# Patient Record
Sex: MALE | Race: WHITE | NOT HISPANIC OR LATINO | Employment: UNEMPLOYED | URBAN - METROPOLITAN AREA
[De-identification: names, ages, dates, MRNs, and addresses within clinical notes are randomized per-mention and may not be internally consistent; named-entity substitution may affect disease eponyms.]

---

## 2020-05-06 ENCOUNTER — HOSPITAL ENCOUNTER (EMERGENCY)
Facility: HOSPITAL | Age: 25
Discharge: HOME | End: 2020-05-06
Attending: EMERGENCY MEDICINE

## 2020-05-06 VITALS
SYSTOLIC BLOOD PRESSURE: 114 MMHG | OXYGEN SATURATION: 97 % | TEMPERATURE: 99 F | HEIGHT: 71 IN | HEART RATE: 90 BPM | RESPIRATION RATE: 14 BRPM | WEIGHT: 150 LBS | BODY MASS INDEX: 21 KG/M2 | DIASTOLIC BLOOD PRESSURE: 62 MMHG

## 2020-05-06 DIAGNOSIS — G89.29 CHRONIC PAIN OF RIGHT THUMB: Primary | ICD-10-CM

## 2020-05-06 DIAGNOSIS — M79.644 CHRONIC PAIN OF RIGHT THUMB: Primary | ICD-10-CM

## 2020-05-06 DIAGNOSIS — Z72.0 TOBACCO USE: ICD-10-CM

## 2020-05-06 PROCEDURE — 99284 PR EMERGENCY DEPT VISIT,LEVEL IV: ICD-10-PCS | Mod: 25,,, | Performed by: EMERGENCY MEDICINE

## 2020-05-06 PROCEDURE — 99284 EMERGENCY DEPT VISIT MOD MDM: CPT | Mod: 25,,, | Performed by: EMERGENCY MEDICINE

## 2020-05-06 PROCEDURE — 99406 PR TOBACCO USE CESSATION INTERMEDIATE 3-10 MINUTES: ICD-10-PCS | Mod: ,,, | Performed by: EMERGENCY MEDICINE

## 2020-05-06 PROCEDURE — 99406 BEHAV CHNG SMOKING 3-10 MIN: CPT | Mod: ,,, | Performed by: EMERGENCY MEDICINE

## 2020-05-06 PROCEDURE — 99282 EMERGENCY DEPT VISIT SF MDM: CPT

## 2020-05-06 NOTE — ED PROVIDER NOTES
Encounter Date: 5/6/2020       History     Chief Complaint   Patient presents with    Thumb pain     Pt c/o right thumb pain.  States he was bitten by his cat approx 1 month ago-has had intermittent problems since.     HPI   24-year-old male previously healthy presenting with right thumb pain.  Patient states he was bitten by a cat 1 month ago and has had intermittent pain and swelling to his right thumb.  He denies any fevers, chills, nausea, vomiting or diarrhea.  He is requesting a work note.  Denies any falls or trauma.  Denies any arm pain, arm swelling.  Current pain scale 2/10.  He has not tried anything for pain medication.  No aggravating or alleviating factors.    Review of patient's allergies indicates:  No Known Allergies  History reviewed. No pertinent past medical history.  History reviewed. No pertinent surgical history.  History reviewed. No pertinent family history.  Social History     Tobacco Use    Smoking status: Current Every Day Smoker     Packs/day: 1.50     Types: Cigarettes    Smokeless tobacco: Never Used   Substance Use Topics    Alcohol use: No    Drug use: No     Review of Systems   Constitutional: Negative for fatigue and fever.   HENT: Negative for congestion and sore throat.    Eyes: Negative for visual disturbance.   Respiratory: Negative for chest tightness and shortness of breath.    Cardiovascular: Negative for chest pain and palpitations.   Gastrointestinal: Negative for abdominal pain, nausea and vomiting.   Genitourinary: Negative for dysuria and flank pain.   Musculoskeletal: Positive for myalgias. Negative for arthralgias, back pain and neck stiffness.   Skin: Negative for color change and wound.   Neurological: Negative for tremors and weakness.   Psychiatric/Behavioral: Negative for agitation and confusion. The patient is nervous/anxious.        Physical Exam     Initial Vitals [05/06/20 1401]   BP Pulse Resp Temp SpO2   114/62 90 14 98.6 °F (37 °C) 97 %      MAP        --         Physical Exam    Nursing note and vitals reviewed.    Gen/Constitutional: Interactive. No acute distress  Head: Normocephalic, Atraumatic  Neck: supple, no masses or LAD  Eyes: PERRLA, conjunctiva clear  Ears, Nose and Throat: No rhinorrhea or stridor.  Cardiac: Reg Rhythm, No murmur  Pulmonary: CTA Bilat, no wheezes, rhonchi, rales.  GI: Abdomen soft, non-tender, non-distended; no rebound or guarding  : No CVA tenderness.  Musculoskeletal: Extremities warm, well perfused, no erythema, no edema  Right thumb:  Old healed scar, no fusiform swelling, sensory intact to light touch, flexion and extension intact, distal pulses intact, there is no edema or erythema  Skin: No rashes  Neuro: Alert and Oriented x 3; No focal motor or sensory deficits.    Psych: Normal affect      ED Course   Procedures  Labs Reviewed - No data to display       Imaging Results    None          Medical Decision Making:   History:   Old Medical Records: I decided to obtain old medical records.  Initial Assessment:   24-year-old male with right thumb pain.  Differential Diagnosis:   Differential diagnosis includes but not limited to:  Cellulitis, abscess, necrotizing fasciitis, musculoskeletal sprain, fracture    Urgent evaluation of patient presenting with right thumb pain in the setting of a cat bite that occurred 1 month ago.  No signs infection, he is afebrile, vital signs are stable.  Physical exam findings unremarkable, with sensory intact, motor intact, and distal pulses intact.  No acute signs of active cellulitis or abscess.  Suspect likely musculoskeletal sprain, with no evidence of fracture as he has full range of motion.  Outpatient follow-up as needed, Tylenol or ibuprofen for pain. Patient agreeable to discharge plan. Strict ED precautions and return instructions discussed at length and patient verbalized understanding. All questions were answered and ample time was given for questions.  I did discuss tobacco  cessation for greater than 10 min, including risks associated with it, offered him nicotine patches, and education on safety.  Patient declined nicotine patches at this time.    Complexity:  Moderate high    Additional MDM:   Smoking Cessation: The patient is a smoker. The patient was counseled on smoking cessation for: 10 minutes. The patient was counseled on tobacco related  health complications. Appropriate patient literature was given to the patient concerning tobacco cessation. The patient was counseled on the adverse effects of second hand smoke. The patient was counseled on hazards of smoking while driving. The patient refused a prescription for nicotine patches.                               Clinical Impression:       ICD-10-CM ICD-9-CM   1. Chronic pain of right thumb M79.644 729.5    G89.29 338.29   2. Tobacco use Z72.0 305.1             ED Disposition Condition    Discharge Stable        ED Prescriptions     None        Follow-up Information     Follow up With Specialties Details Why Contact Info    [facility]  Schedule an appointment as soon as possible for a visit in 1 week  [address] [facility] [address]  [phone]                      Vito Uriostegui DO, FAAEM  Emergency Staff Physician   Dept of Emergency Medicine   [facility]  Spectralink: [phone]                   Vito Uriostegui DO  05/09/20 0537

## 2020-05-06 NOTE — DISCHARGE INSTRUCTIONS
Today, there is no evidence of infection of your right thumb.  If you began to have swelling, you may raise your arm above your heart, you may also use an Ace wrap which was given to you today.  For pain management, you should use Tylenol or ibuprofen as needed.  You should avoid smoking as smoking can cause poor healing and blood flow.  Follow-up with your primary care in 1 week for re-evaluation.  If you do not have a primary care, please call the phone number above to establish a primary care physician.    Our goal in the emergency department is to always give you outstanding care and exceptional service. You may receive a survey by mail or e-mail in the next week regarding your experience in our ED. We would greatly appreciate your completing and returning the survey. Your feedback provides us with a way to recognize our staff who give very good care and it helps us learn how to improve when your experience was below our aspiration of excellence.

## 2020-05-06 NOTE — ED TRIAGE NOTES
Patient states pain to right hand thumb from palm to upper thumb after being bit by a cat 1 month PTA. Tylenol yesterday morning.

## 2020-05-06 NOTE — PROVIDER PROGRESS NOTES - EMERGENCY DEPT.
Emergency Department TeleTRIAGE Encounter Note      CHIEF COMPLAINT    Chief Complaint   Patient presents with    Thumb pain     Pt c/o right thumb pain.  States he was bitten by his cat approx 1 month ago-has had intermittent problems since.       VITAL SIGNS   Initial Vitals [05/06/20 1401]   BP Pulse Resp Temp SpO2   114/62 90 14 98.6 °F (37 °C) 97 %      MAP       --            ALLERGIES    Review of patient's allergies indicates:  No Known Allergies    PROVIDER TRIAGE NOTE  Patient presents to the ED for evaluation of thumb pain.  Patient reports cat bite to his right thumb approximately 1 month ago while he was giving her a bath.  He states the next day is somewhat swollen but the swelling resolved after taking some Tylenol.  He reports intermittent swelling and pain to the thumb over the past several weeks.  He reports swelling and pain for the past 2 days that was not relieved with Tylenol.  He denies fever, decreased range of motion of the thumb, other injuries.  Last tetanus was 2-3 years ago.      ORDERS  Labs Reviewed - No data to display    ED Orders (720h ago, onward)    None            Virtual Visit Note: The provider triage portion of this emergency department evaluation and documentation was performed via [employer], a HIPAA-compliant telemedicine application, in concert with a tele-presenter in the room. A face to face patient evaluation with one of my colleagues will occur once the patient is placed in an emergency department room.      DISCLAIMER: This note was prepared with [employer] voice recognition transcription software. Garbled syntax, mangled pronouns, and other bizarre constructions may be attributed to that software system.

## 2020-05-06 NOTE — ED NOTES
Patient identifiers verified and correct for Mr Arce  C/C: Swelling and pain to right hand thumb SEE NN  APPEARANCE: awake and alert in NAD.  SKIN: warm, dry Slight swelling, min redness noted to thumb  MUSCULOSKELETAL: Patient moving all extremities spontaneously, no obvious swelling or deformities noted. Ambulates independently.  RESPIRATORY: Denies shortness of breath.Respirations unlabored.   CARDIAC: Denies CP, 2+ distal pulses; no peripheral edema  ABDOMEN: S/ND/NT, Denies nausea  : voids spontaneously, denies difficulty  Neurologic: AAO x 4; follows commands equal strength in all extremities; denies numbness/tingling. Denies dizziness Denies weakness

## 2021-07-27 ENCOUNTER — HOSPITAL ENCOUNTER (EMERGENCY)
Facility: HOSPITAL | Age: 26
Discharge: HOME | End: 2021-07-27
Attending: EMERGENCY MEDICINE
Payer: MEDICAID

## 2021-07-27 VITALS
TEMPERATURE: 99 F | DIASTOLIC BLOOD PRESSURE: 51 MMHG | RESPIRATION RATE: 14 BRPM | OXYGEN SATURATION: 98 % | HEART RATE: 85 BPM | SYSTOLIC BLOOD PRESSURE: 102 MMHG

## 2021-07-27 DIAGNOSIS — J06.9 UPPER RESPIRATORY TRACT INFECTION, UNSPECIFIED TYPE: Primary | ICD-10-CM

## 2021-07-27 LAB
CTP QC/QA: YES
SARS-COV-2 RDRP RESP QL NAA+PROBE: NEGATIVE

## 2021-07-27 PROCEDURE — 99282 PR EMERGENCY DEPT VISIT,LEVEL II: ICD-10-PCS | Mod: CS,,, | Performed by: PHYSICIAN ASSISTANT

## 2021-07-27 PROCEDURE — 99282 EMERGENCY DEPT VISIT SF MDM: CPT | Mod: CS,,, | Performed by: PHYSICIAN ASSISTANT

## 2021-07-27 PROCEDURE — 99284 EMERGENCY DEPT VISIT MOD MDM: CPT

## 2021-07-27 PROCEDURE — U0002 COVID-19 LAB TEST NON-CDC: HCPCS | Performed by: EMERGENCY MEDICINE

## 2021-07-27 RX ORDER — IBUPROFEN 800 MG/1
800 TABLET ORAL EVERY 6 HOURS PRN
Qty: 20 TABLET | Refills: 0 | Status: SHIPPED | OUTPATIENT
Start: 2021-07-27

## 2021-07-27 RX ORDER — FLUTICASONE PROPIONATE 50 MCG
1 SPRAY, SUSPENSION (ML) NASAL 2 TIMES DAILY PRN
Qty: 15 G | Refills: 0 | Status: SHIPPED | OUTPATIENT
Start: 2021-07-27

## 2021-07-27 RX ORDER — BENZONATATE 100 MG/1
200 CAPSULE ORAL 3 TIMES DAILY PRN
Qty: 20 CAPSULE | Refills: 0 | Status: SHIPPED | OUTPATIENT
Start: 2021-07-27 | End: 2021-08-06